# Patient Record
(demographics unavailable — no encounter records)

---

## 2024-12-27 NOTE — ASSESSMENT
[FreeTextEntry1] : 68F p/w L troch bursitis and lumbar radic  Begin PT meloxicam for pain return 6 weeks  The patient's current medication management of their orthopedic diagnosis was reviewed today:   (1) We discussed a comprehensive treatment plan that included possible pharmaceutical management involving the use of prescription strength medications including but not limited to options such as oral Naprosyn 500mg BID, once daily Meloxicam 15 mg, or 500-650 mg Tylenol versus over the counter oral medications and topical prescription NSAID Pennsaid vs over the counter Voltaren gel.   (2) There is a moderate risk of morbidity with further treatment, especially from use of prescription strength medications and possible side effects of these medications which include upset stomach with oral medications, skin reactions to topical medications and cardiac/renal issues with long term use.   (3) I recommended that the patient follow-up with their medical physician to discuss any significant specific potential issues with long term medication use such as interactions with current medications or with exacerbation of underlying medical comorbidities.   (4) The benefits and risks associated with use of injectable, oral or topical, prescription and over the counter anti-inflammatory medications were discussed with the patient. The patient voiced understanding of the risks including but not limited to bleeding, stroke, kidney dysfunction, heart disease, and were referred to the black box warning label for further information.  Prior to appointment and during encounter with patient extensive medical records were reviewed including but not limited to, hospital records, out patient records, imaging results, and lab data. During this appointment the patient was examined, diagnoses were discussed and explained in a face to face manner. In addition extensive time was spent reviewing aforementioned diagnostic studies. Counseling including abnormal image results, differential diagnoses, treatment options, risk and benefits, lifestyle changes, current condition, and current medications was performed. Patient's comments, questions, and concerns were address and patient verbalized understanding.

## 2024-12-27 NOTE — HISTORY OF PRESENT ILLNESS
[Household chores] : household chores [Social interactions] : social interactions [Dull/Aching] : dull/aching [Sharp] : sharp [] : yes [Retired] : Work status: retired [de-identified] : 12/27/24: patient is here today for left hip pain, pain started she has hernia sx back in 2009 and has been having pain lately (over a month) has been having increased amount of pain within her hip. Complaining of lateral hip pain with radiation to her shin, no groin pain. She notices her some numbness in her toes on occasion [FreeTextEntry1] : LT HIP  [FreeTextEntry7] : from left hip down into left knee. [FreeTextEntry9] : denies [de-identified] : always there  [de-identified] : RETIRED [employer]